# Patient Record
Sex: MALE | Race: WHITE | Employment: FULL TIME | URBAN - METROPOLITAN AREA
[De-identification: names, ages, dates, MRNs, and addresses within clinical notes are randomized per-mention and may not be internally consistent; named-entity substitution may affect disease eponyms.]

---

## 2017-12-26 ENCOUNTER — TELEPHONE (OUTPATIENT)
Dept: INTERNAL MEDICINE CLINIC | Facility: CLINIC | Age: 42
End: 2017-12-26

## 2017-12-26 RX ORDER — FLUTICASONE PROPIONATE AND SALMETEROL 250; 50 UG/1; UG/1
1 POWDER RESPIRATORY (INHALATION) EVERY 12 HOURS SCHEDULED
Qty: 1 PACKAGE | Refills: 0 | Status: SHIPPED | OUTPATIENT
Start: 2017-12-26

## 2017-12-26 RX ORDER — METHYLPREDNISOLONE 4 MG/1
TABLET ORAL
Qty: 1 KIT | Refills: 0 | Status: SHIPPED | OUTPATIENT
Start: 2017-12-26

## 2017-12-26 RX ORDER — AMOXICILLIN 500 MG/1
500 CAPSULE ORAL 3 TIMES DAILY
Qty: 21 CAPSULE | Refills: 0 | Status: SHIPPED | OUTPATIENT
Start: 2017-12-26 | End: 2018-01-05

## 2017-12-26 NOTE — TELEPHONE ENCOUNTER
Calling with pharmacy in [facility] to send rx ordered by Dr. Stubbs Courts in [facility] on [address] [address] [facility] [address] [facility].      Dr. Quintanilla Hand called with order for:    Amoxicillin 500mg tid x 7 D  Medrol dose zaina  Advair inhaler 50/250 or alternative if insurance not

## 2018-02-13 ENCOUNTER — HOSPITAL ENCOUNTER (OUTPATIENT)
Dept: CV DIAGNOSTICS | Facility: HOSPITAL | Age: 43
Discharge: HOME | End: 2018-02-13
Attending: INTERNAL MEDICINE
Payer: COMMERCIAL

## 2018-02-13 DIAGNOSIS — R07.9 CHEST PAIN, UNSPECIFIED TYPE: ICD-10-CM

## 2018-02-13 PROCEDURE — 93018 CV STRESS TEST I&R ONLY: CPT | Performed by: INTERNAL MEDICINE

## 2018-02-13 PROCEDURE — 93350 STRESS TTE ONLY: CPT | Performed by: INTERNAL MEDICINE

## 2018-02-13 PROCEDURE — 93017 CV STRESS TEST TRACING ONLY: CPT | Performed by: INTERNAL MEDICINE

## 2018-02-15 ENCOUNTER — TELEPHONE (OUTPATIENT)
Dept: INTERNAL MEDICINE CLINIC | Facility: CLINIC | Age: 43
End: 2018-02-15

## 2018-02-15 NOTE — TELEPHONE ENCOUNTER
nsg spoke with patient to get pts primary MD   Dr. Mayte Rosales [phone]  Results faxed to doctor

## 2018-08-18 ENCOUNTER — HOSPITAL ENCOUNTER (OUTPATIENT)
Age: 43
Discharge: ANOTHER HOSPITAL | End: 2018-08-18
Attending: FAMILY MEDICINE
Payer: COMMERCIAL

## 2018-08-18 VITALS
TEMPERATURE: 98 F | BODY MASS INDEX: 20.27 KG/M2 | WEIGHT: 163 LBS | DIASTOLIC BLOOD PRESSURE: 71 MMHG | OXYGEN SATURATION: 99 % | HEART RATE: 68 BPM | HEIGHT: 75 IN | RESPIRATION RATE: 18 BRPM | SYSTOLIC BLOOD PRESSURE: 131 MMHG

## 2018-08-18 DIAGNOSIS — R10.30 LOWER ABDOMINAL PAIN: Primary | ICD-10-CM

## 2018-08-18 LAB
BILIRUB UR QL STRIP: NEGATIVE
CLARITY UR: CLEAR
COLOR UR: YELLOW
GLUCOSE UR STRIP-MCNC: NEGATIVE MG/DL
HGB UR QL STRIP: NEGATIVE
KETONES UR STRIP-MCNC: NEGATIVE MG/DL
LEUKOCYTE ESTERASE UR QL STRIP: NEGATIVE
NITRITE UR QL STRIP: NEGATIVE
PH UR STRIP: 6.5 [PH]
PROT UR STRIP-MCNC: NEGATIVE MG/DL
SP GR UR STRIP: 1.01
UROBILINOGEN UR STRIP-ACNC: <2 MG/DL

## 2018-08-18 PROCEDURE — 99202 OFFICE O/P NEW SF 15 MIN: CPT

## 2018-08-18 PROCEDURE — 81003 URINALYSIS AUTO W/O SCOPE: CPT

## 2018-08-18 PROCEDURE — 99203 OFFICE O/P NEW LOW 30 MIN: CPT

## 2018-08-18 RX ORDER — CETIRIZINE HYDROCHLORIDE 10 MG/1
10 TABLET ORAL DAILY
COMMUNITY

## 2018-08-18 NOTE — ED NOTES
Per MD recommendation pt will go to [facility] for further evaluation, Pt verbalizes understanding of follow up leaving IC stable no acute distress noted

## 2018-08-18 NOTE — ED PROVIDER NOTES
Patient Seen in: [address]    History   Patient presents with:  Abdomen/Flank Pain (GI/)    Stated Complaint: abdominal pain    HPI  42-year-old male with no significant past medical history presents to IC with about sounds normal. He has no rales. Abdominal: Soft. Bowel sounds are normal. He exhibits no distension and no mass. There is no tenderness. There is no rebound and no guarding. Skin: Skin is warm. No rash noted. He is not diaphoretic. No erythema.  No pall

## 2019-04-15 ENCOUNTER — HOSPITAL ENCOUNTER (OUTPATIENT)
Age: 44
Discharge: OTHER INSTITUTION | End: 2019-04-15
Attending: FAMILY MEDICINE
Payer: COMMERCIAL

## 2019-04-15 VITALS
TEMPERATURE: 99 F | HEART RATE: 68 BPM | RESPIRATION RATE: 18 BRPM | OXYGEN SATURATION: 100 % | SYSTOLIC BLOOD PRESSURE: 121 MMHG | DIASTOLIC BLOOD PRESSURE: 70 MMHG

## 2019-04-15 DIAGNOSIS — R20.2 PARESTHESIAS: ICD-10-CM

## 2019-04-15 DIAGNOSIS — R51.9 HEADACHE AROUND THE EYES: Primary | ICD-10-CM

## 2019-04-15 PROCEDURE — 99213 OFFICE O/P EST LOW 20 MIN: CPT

## 2019-04-15 RX ORDER — FINASTERIDE 5 MG/1
TABLET, FILM COATED ORAL
COMMUNITY
Start: 2011-11-22

## 2019-04-15 NOTE — ED PROVIDER NOTES
[address]    History   Patient presents with:  Headache (neurologic)    Stated Complaint: Severe Headache vision issues     HPI  Pt c/o a 6/10 headache that began Friday.   Headache located over the righ Pharynx moist without erythema or exudate. CV:  Regular rate and rhythm. No murmur. Peripheral pulses intact. Respiratory:  Lungs clear to auscultation bilaterally  Abdomen:  Soft, non-tender, non-distended.   Back:  No CVA or vertebral tenderness  Skin:

## 2019-04-15 NOTE — ED INITIAL ASSESSMENT (HPI)
Pt here with complaints of headache to the right side of his head that developed on Friday, pt states today he noticed numbness to his left arm and noticed he hasnt been able to focus well, pt did say he had an episode of dizziness today that last about 30

## 2019-04-15 NOTE — ED NOTES
Pt instructed by md to go to the ER for further evaluation on his headache and left arm numbness, pt states he will be going to [facility] ER via car